# Patient Record
Sex: MALE | Race: WHITE | NOT HISPANIC OR LATINO | URBAN - METROPOLITAN AREA
[De-identification: names, ages, dates, MRNs, and addresses within clinical notes are randomized per-mention and may not be internally consistent; named-entity substitution may affect disease eponyms.]

---

## 2017-08-12 ENCOUNTER — OUTPATIENT (OUTPATIENT)
Dept: OUTPATIENT SERVICES | Facility: HOSPITAL | Age: 57
LOS: 1 days | End: 2017-08-12
Payer: COMMERCIAL

## 2017-08-12 ENCOUNTER — APPOINTMENT (OUTPATIENT)
Dept: RADIOLOGY | Facility: IMAGING CENTER | Age: 57
End: 2017-08-12
Payer: COMMERCIAL

## 2017-08-12 DIAGNOSIS — Z00.8 ENCOUNTER FOR OTHER GENERAL EXAMINATION: ICD-10-CM

## 2017-08-12 PROCEDURE — 73502 X-RAY EXAM HIP UNI 2-3 VIEWS: CPT

## 2017-08-12 PROCEDURE — 73502 X-RAY EXAM HIP UNI 2-3 VIEWS: CPT | Mod: 26,LT

## 2017-12-23 ENCOUNTER — APPOINTMENT (OUTPATIENT)
Dept: RADIOLOGY | Facility: CLINIC | Age: 57
End: 2017-12-23
Payer: MEDICAID

## 2017-12-23 ENCOUNTER — OUTPATIENT (OUTPATIENT)
Dept: OUTPATIENT SERVICES | Facility: HOSPITAL | Age: 57
LOS: 1 days | End: 2017-12-23
Payer: COMMERCIAL

## 2017-12-23 DIAGNOSIS — S73.199A OTHER SPRAIN OF UNSPECIFIED HIP, INITIAL ENCOUNTER: ICD-10-CM

## 2017-12-23 DIAGNOSIS — M25.859 OTHER SPECIFIED JOINT DISORDERS, UNSPECIFIED HIP: ICD-10-CM

## 2017-12-23 DIAGNOSIS — M19.90 UNSPECIFIED OSTEOARTHRITIS, UNSPECIFIED SITE: ICD-10-CM

## 2017-12-23 PROCEDURE — 73502 X-RAY EXAM HIP UNI 2-3 VIEWS: CPT

## 2017-12-23 PROCEDURE — 73502 X-RAY EXAM HIP UNI 2-3 VIEWS: CPT | Mod: 26,LT

## 2018-01-16 ENCOUNTER — OUTPATIENT (OUTPATIENT)
Dept: OUTPATIENT SERVICES | Facility: HOSPITAL | Age: 58
LOS: 1 days | End: 2018-01-16
Payer: COMMERCIAL

## 2018-01-16 ENCOUNTER — APPOINTMENT (OUTPATIENT)
Dept: SURGERY | Facility: CLINIC | Age: 58
End: 2018-01-16

## 2018-01-16 DIAGNOSIS — M25.852 OTHER SPECIFIED JOINT DISORDERS, LEFT HIP: ICD-10-CM

## 2018-01-16 DIAGNOSIS — Z01.818 ENCOUNTER FOR OTHER PREPROCEDURAL EXAMINATION: ICD-10-CM

## 2018-01-16 DIAGNOSIS — M19.90 UNSPECIFIED OSTEOARTHRITIS, UNSPECIFIED SITE: ICD-10-CM

## 2018-01-16 DIAGNOSIS — S73.192D OTHER SPRAIN OF LEFT HIP, SUBSEQUENT ENCOUNTER: ICD-10-CM

## 2018-01-16 LAB
ALBUMIN SERPL ELPH-MCNC: 4.7 G/DL — SIGNIFICANT CHANGE UP (ref 3.3–5)
ALP SERPL-CCNC: 53 U/L — SIGNIFICANT CHANGE UP (ref 40–120)
ALT FLD-CCNC: 30 U/L — SIGNIFICANT CHANGE UP (ref 10–45)
ANION GAP SERPL CALC-SCNC: 14 MMOL/L — SIGNIFICANT CHANGE UP (ref 5–17)
APTT BLD: 30.3 SEC — SIGNIFICANT CHANGE UP (ref 27.5–37.4)
AST SERPL-CCNC: 22 U/L — SIGNIFICANT CHANGE UP (ref 10–40)
BILIRUB SERPL-MCNC: 1.2 MG/DL — SIGNIFICANT CHANGE UP (ref 0.2–1.2)
BUN SERPL-MCNC: 17 MG/DL — SIGNIFICANT CHANGE UP (ref 7–23)
CALCIUM SERPL-MCNC: 9.7 MG/DL — SIGNIFICANT CHANGE UP (ref 8.4–10.5)
CHLORIDE SERPL-SCNC: 100 MMOL/L — SIGNIFICANT CHANGE UP (ref 96–108)
CO2 SERPL-SCNC: 23 MMOL/L — SIGNIFICANT CHANGE UP (ref 22–31)
CREAT SERPL-MCNC: 0.96 MG/DL — SIGNIFICANT CHANGE UP (ref 0.5–1.3)
GLUCOSE SERPL-MCNC: 91 MG/DL — SIGNIFICANT CHANGE UP (ref 70–99)
HCT VFR BLD CALC: 44 % — SIGNIFICANT CHANGE UP (ref 39–50)
HGB BLD-MCNC: 14.9 G/DL — SIGNIFICANT CHANGE UP (ref 13–17)
INR BLD: 1.11 — SIGNIFICANT CHANGE UP (ref 0.88–1.16)
MCHC RBC-ENTMCNC: 31 PG — SIGNIFICANT CHANGE UP (ref 27–34)
MCHC RBC-ENTMCNC: 33.9 G/DL — SIGNIFICANT CHANGE UP (ref 32–36)
MCV RBC AUTO: 91.7 FL — SIGNIFICANT CHANGE UP (ref 80–100)
PLATELET # BLD AUTO: 209 K/UL — SIGNIFICANT CHANGE UP (ref 150–400)
POTASSIUM SERPL-MCNC: 4.2 MMOL/L — SIGNIFICANT CHANGE UP (ref 3.5–5.3)
POTASSIUM SERPL-SCNC: 4.2 MMOL/L — SIGNIFICANT CHANGE UP (ref 3.5–5.3)
PROT SERPL-MCNC: 7.6 G/DL — SIGNIFICANT CHANGE UP (ref 6–8.3)
PROTHROM AB SERPL-ACNC: 12.4 SEC — SIGNIFICANT CHANGE UP (ref 9.8–12.7)
RBC # BLD: 4.8 M/UL — SIGNIFICANT CHANGE UP (ref 4.2–5.8)
RBC # FLD: 12.7 % — SIGNIFICANT CHANGE UP (ref 10.3–16.9)
SODIUM SERPL-SCNC: 137 MMOL/L — SIGNIFICANT CHANGE UP (ref 135–145)
WBC # BLD: 7.4 K/UL — SIGNIFICANT CHANGE UP (ref 3.8–10.5)
WBC # FLD AUTO: 7.4 K/UL — SIGNIFICANT CHANGE UP (ref 3.8–10.5)

## 2018-01-16 PROCEDURE — 93010 ELECTROCARDIOGRAM REPORT: CPT

## 2018-07-16 ENCOUNTER — OUTPATIENT (OUTPATIENT)
Dept: OUTPATIENT SERVICES | Facility: HOSPITAL | Age: 58
LOS: 1 days | End: 2018-07-16
Payer: COMMERCIAL

## 2018-07-16 PROCEDURE — 73502 X-RAY EXAM HIP UNI 2-3 VIEWS: CPT

## 2018-07-16 PROCEDURE — 73502 X-RAY EXAM HIP UNI 2-3 VIEWS: CPT | Mod: 26,LT

## 2019-07-24 ENCOUNTER — APPOINTMENT (OUTPATIENT)
Dept: COLORECTAL SURGERY | Facility: CLINIC | Age: 59
End: 2019-07-24
Payer: COMMERCIAL

## 2019-07-24 VITALS
SYSTOLIC BLOOD PRESSURE: 110 MMHG | WEIGHT: 160 LBS | DIASTOLIC BLOOD PRESSURE: 70 MMHG | RESPIRATION RATE: 12 BRPM | HEIGHT: 67 IN | HEART RATE: 54 BPM | BODY MASS INDEX: 25.11 KG/M2

## 2019-07-24 DIAGNOSIS — K63.5 POLYP OF COLON: ICD-10-CM

## 2019-07-24 DIAGNOSIS — Z83.49 FAMILY HISTORY OF OTHER ENDOCRINE, NUTRITIONAL AND METABOLIC DISEASES: ICD-10-CM

## 2019-07-24 DIAGNOSIS — Z80.0 FAMILY HISTORY OF MALIGNANT NEOPLASM OF DIGESTIVE ORGANS: ICD-10-CM

## 2019-07-24 PROCEDURE — 99203 OFFICE O/P NEW LOW 30 MIN: CPT

## 2019-07-24 NOTE — HISTORY OF PRESENT ILLNESS
[FreeTextEntry1] : 58yo WM, fmaternal family hx colon CA/personal hx colon polyps.  Last colonoscopy 2013.  Presents for f/u colonoscopy.

## 2019-07-24 NOTE — PHYSICAL EXAM
[Normal Breath Sounds] : Normal breath sounds [No Edema] : No edema [Normal Heart Sounds] : normal heart sounds [Normal Rate and Rhythm] : normal rate and rhythm [Alert] : alert [Oriented to Place] : oriented to place [Oriented to Person] : oriented to person [Anxious] : anxious [Oriented to Time] : oriented to time [de-identified] : flat +BS NT/ND [de-identified] : NC/AT [de-identified] : +ROM [de-identified] : intact

## 2019-07-24 NOTE — REVIEW OF SYSTEMS
[Chest Pain] : no chest pain [Shortness Of Breath] : no shortness of breath [Palpitations] : no palpitations [Wheezing] : no wheezing [Easy Bleeding] : no tendency for easy bleeding [Negative] : Psychiatric [FreeTextEntry7] : daily BM

## 2019-08-05 ENCOUNTER — APPOINTMENT (OUTPATIENT)
Dept: COLORECTAL SURGERY | Facility: CLINIC | Age: 59
End: 2019-08-05
Payer: MEDICAID

## 2019-08-05 PROCEDURE — 45378 DIAGNOSTIC COLONOSCOPY: CPT

## 2019-08-05 RX ORDER — SODIUM PICOSULFATE, MAGNESIUM OXIDE, AND ANHYDROUS CITRIC ACID 10; 3.5; 12 MG/160ML; G/160ML; G/160ML
10-3.5-12 MG-GM LIQUID ORAL
Qty: 2 | Refills: 0 | Status: DISCONTINUED | COMMUNITY
Start: 2019-07-24 | End: 2019-08-05

## 2019-08-16 ENCOUNTER — OUTPATIENT (OUTPATIENT)
Dept: OUTPATIENT SERVICES | Facility: HOSPITAL | Age: 59
LOS: 1 days | End: 2019-08-16
Payer: COMMERCIAL

## 2019-08-16 PROCEDURE — 73501 X-RAY EXAM HIP UNI 1 VIEW: CPT

## 2019-08-16 PROCEDURE — 73501 X-RAY EXAM HIP UNI 1 VIEW: CPT | Mod: 26,LT

## 2023-09-01 ENCOUNTER — APPOINTMENT (OUTPATIENT)
Dept: COLORECTAL SURGERY | Facility: CLINIC | Age: 63
End: 2023-09-01
Payer: MEDICAID

## 2023-09-01 VITALS
BODY MASS INDEX: 26.68 KG/M2 | WEIGHT: 170 LBS | RESPIRATION RATE: 12 BRPM | TEMPERATURE: 97.7 F | DIASTOLIC BLOOD PRESSURE: 64 MMHG | HEART RATE: 54 BPM | HEIGHT: 67 IN | SYSTOLIC BLOOD PRESSURE: 110 MMHG

## 2023-09-01 PROCEDURE — 99203 OFFICE O/P NEW LOW 30 MIN: CPT

## 2023-09-01 NOTE — HISTORY OF PRESENT ILLNESS
[FreeTextEntry1] : 62yo WM, maternal grandmother +hx colon CA/remote personal hx colonic polyp. Last colonoscopy 2019 (nl study). Presents for f/u colonoscopy.  Daily BM. Denies abdominal pain, rectal bleeding.

## 2023-09-01 NOTE — PHYSICAL EXAM
[Normal Breath Sounds] : Normal breath sounds [Normal Heart Sounds] : normal heart sounds [Normal Rate and Rhythm] : normal rate and rhythm [No Edema] : No edema [Alert] : alert [Oriented to Person] : oriented to person [Oriented to Place] : oriented to place [Oriented to Time] : oriented to time [Calm] : calm [de-identified] : flat +BS NT/ND [de-identified] : NC/AT [de-identified] : +ROM [de-identified] : intact

## 2023-10-09 ENCOUNTER — APPOINTMENT (OUTPATIENT)
Dept: COLORECTAL SURGERY | Facility: CLINIC | Age: 63
End: 2023-10-09
Payer: MEDICAID

## 2023-10-09 PROCEDURE — 45385 COLONOSCOPY W/LESION REMOVAL: CPT

## 2023-10-11 LAB — CORE LAB BIOPSY: NORMAL

## 2025-03-18 ENCOUNTER — RX ONLY (RX ONLY)
Age: 65
End: 2025-03-18

## 2025-03-18 ENCOUNTER — OFFICE (OUTPATIENT)
Dept: URBAN - METROPOLITAN AREA CLINIC 27 | Facility: CLINIC | Age: 65
Setting detail: OPHTHALMOLOGY
End: 2025-03-18
Payer: COMMERCIAL

## 2025-03-18 DIAGNOSIS — H35.63: ICD-10-CM

## 2025-03-18 DIAGNOSIS — H01.001: ICD-10-CM

## 2025-03-18 DIAGNOSIS — H40.013: ICD-10-CM

## 2025-03-18 DIAGNOSIS — H25.13: ICD-10-CM

## 2025-03-18 DIAGNOSIS — H52.13: ICD-10-CM

## 2025-03-18 PROCEDURE — 92250 FUNDUS PHOTOGRAPHY W/I&R: CPT | Performed by: OPHTHALMOLOGY

## 2025-03-18 PROCEDURE — 92004 COMPRE OPH EXAM NEW PT 1/>: CPT | Performed by: OPHTHALMOLOGY

## 2025-03-18 PROCEDURE — 76514 ECHO EXAM OF EYE THICKNESS: CPT | Performed by: OPHTHALMOLOGY

## 2025-03-18 PROCEDURE — 92015 DETERMINE REFRACTIVE STATE: CPT | Performed by: OPHTHALMOLOGY

## 2025-03-18 ASSESSMENT — LID EXAM ASSESSMENTS
OS_COMMENTS: MODERATE COLLARETTES
OS_BLEPHARITIS: LLL LUL 3+ 4+
OD_BLEPHARITIS: RLL RUL 3+ 4+
OD_COMMENTS: MODERATE COLLARETTES

## 2025-03-18 ASSESSMENT — PACHYMETRY
OS_CT_CORRECTION: 1
OD_CT_CORRECTION: 1
OS_CT_UM: 537
OD_CT_UM: 535

## 2025-03-18 ASSESSMENT — KERATOMETRY
OS_K2POWER_DIOPTERS: 440.50
OS_AXISANGLE_DEGREES: 87
METHOD_AUTO_MANUAL: AUTO
OD_K2POWER_DIOPTERS: 44.00
OD_AXISANGLE_DEGREES: 90
OD_K1POWER_DIOPTERS: 44.00
OS_K1POWER_DIOPTERS: 44.00

## 2025-03-18 ASSESSMENT — REFRACTION_MANIFEST
OD_SPHERE: -2.25
OD_CYLINDER: SPH
OS_VA1: 20/20
OS_CYLINDER: SPH
OD_VA1: 20/20
OS_SPHERE: -2.25

## 2025-03-18 ASSESSMENT — REFRACTION_AUTOREFRACTION
OD_SPHERE: -1.75
OD_AXIS: 00
OS_SPHERE: -2.25
OS_AXIS: 1
OS_CYLINDER: +0.25
OD_CYLINDER: 0.00

## 2025-03-18 ASSESSMENT — TONOMETRY
OS_IOP_MMHG: 14
OS_IOP_MMHG: 14
OD_IOP_MMHG: 12
OD_IOP_MMHG: 12

## 2025-03-18 ASSESSMENT — CONFRONTATIONAL VISUAL FIELD TEST (CVF)
OD_FINDINGS: FULL
OS_FINDINGS: FULL

## 2025-03-18 ASSESSMENT — REFRACTION_CURRENTRX
OS_VPRISM_DIRECTION: SV
OD_VPRISM_DIRECTION: SV
OD_AXIS: SPH
OS_AXIS: SPH
OD_OVR_VA: 20/
OS_OVR_VA: 20/
OD_SPHERE: -2.50
OD_CYLINDER: SPH
OS_SPHERE: -2.25
OS_CYLINDER: SPH

## 2025-03-18 ASSESSMENT — VISUAL ACUITY
OD_BCVA: 20/20
OS_BCVA: 20/30

## 2025-03-20 ENCOUNTER — DOCTOR'S OFFICE (OUTPATIENT)
Facility: LOCATION | Age: 65
Setting detail: OPHTHALMOLOGY
End: 2025-03-20
Payer: COMMERCIAL

## 2025-03-20 DIAGNOSIS — H43.391: ICD-10-CM

## 2025-03-20 DIAGNOSIS — H35.62: ICD-10-CM

## 2025-03-20 PROBLEM — H52.7 REFRACTIVE ERROR ; BOTH EYES: Status: ACTIVE | Noted: 2025-03-18

## 2025-03-20 PROBLEM — H01.001 BLEPHARITIS; RIGHT UPPER LID, RIGHT LOWER LID, LEFT UPPER LID, LEFT LOWER LID: Status: ACTIVE | Noted: 2025-03-18

## 2025-03-20 PROBLEM — H01.002 BLEPHARITIS; RIGHT UPPER LID, RIGHT LOWER LID, LEFT UPPER LID, LEFT LOWER LID: Status: ACTIVE | Noted: 2025-03-18

## 2025-03-20 PROBLEM — H01.005 BLEPHARITIS; RIGHT UPPER LID, RIGHT LOWER LID, LEFT UPPER LID, LEFT LOWER LID: Status: ACTIVE | Noted: 2025-03-18

## 2025-03-20 PROBLEM — H25.13 CATARACT SENILE NUCLEAR SCLEROSIS; BOTH EYES: Status: ACTIVE | Noted: 2025-03-18

## 2025-03-20 PROBLEM — H40.013 GLAUCOMA SUSPECT, LOW RISK 1-2 FACTORS; BOTH EYES: Status: ACTIVE | Noted: 2025-03-18

## 2025-03-20 PROBLEM — H01.004 BLEPHARITIS; RIGHT UPPER LID, RIGHT LOWER LID, LEFT UPPER LID, LEFT LOWER LID: Status: ACTIVE | Noted: 2025-03-18

## 2025-03-20 PROCEDURE — 92201 OPSCPY EXTND RTA DRAW UNI/BI: CPT | Performed by: OPHTHALMOLOGY

## 2025-03-20 PROCEDURE — 92012 INTRM OPH EXAM EST PATIENT: CPT | Performed by: OPHTHALMOLOGY

## 2025-03-20 PROCEDURE — 92134 CPTRZ OPH DX IMG PST SGM RTA: CPT | Performed by: OPHTHALMOLOGY

## 2025-03-20 ASSESSMENT — REFRACTION_AUTOREFRACTION
OD_AXIS: 00
OS_SPHERE: -2.25
OS_AXIS: 1
OD_CYLINDER: 0.00
OS_CYLINDER: +0.25
OD_SPHERE: -1.75

## 2025-03-20 ASSESSMENT — KERATOMETRY
METHOD_AUTO_MANUAL: AUTO
OD_K1POWER_DIOPTERS: 44.00
OS_K2POWER_DIOPTERS: 440.50
OS_AXISANGLE_DEGREES: 87
OD_AXISANGLE_DEGREES: 90
OD_K2POWER_DIOPTERS: 44.00
OS_K1POWER_DIOPTERS: 44.00

## 2025-03-20 ASSESSMENT — LID EXAM ASSESSMENTS
OS_COMMENTS: MODERATE COLLARETTES
OS_BLEPHARITIS: LLL LUL 3+ 4+
OD_COMMENTS: MODERATE COLLARETTES
OD_BLEPHARITIS: RLL RUL 3+ 4+

## 2025-03-20 ASSESSMENT — CONFRONTATIONAL VISUAL FIELD TEST (CVF)
OS_FINDINGS: FULL
OD_FINDINGS: FULL

## 2025-03-20 ASSESSMENT — VISUAL ACUITY
OD_BCVA: 20/20
OS_BCVA: 20/20

## 2025-04-24 ENCOUNTER — DOCTOR'S OFFICE (OUTPATIENT)
Facility: LOCATION | Age: 65
Setting detail: OPHTHALMOLOGY
End: 2025-04-24
Payer: COMMERCIAL

## 2025-04-24 DIAGNOSIS — H35.62: ICD-10-CM

## 2025-04-24 DIAGNOSIS — H43.391: ICD-10-CM

## 2025-04-24 PROCEDURE — 92134 CPTRZ OPH DX IMG PST SGM RTA: CPT | Performed by: OPHTHALMOLOGY

## 2025-04-24 PROCEDURE — 99213 OFFICE O/P EST LOW 20 MIN: CPT | Performed by: OPHTHALMOLOGY

## 2025-04-24 ASSESSMENT — CONFRONTATIONAL VISUAL FIELD TEST (CVF)
OS_FINDINGS: FULL
OD_FINDINGS: FULL

## 2025-04-24 ASSESSMENT — LID EXAM ASSESSMENTS
OD_COMMENTS: MODERATE COLLARETTES
OD_BLEPHARITIS: RLL RUL 3+ 4+
OS_BLEPHARITIS: LLL LUL 3+ 4+
OS_COMMENTS: MODERATE COLLARETTES

## 2025-04-24 ASSESSMENT — KERATOMETRY
METHOD_AUTO_MANUAL: AUTO
OD_K1POWER_DIOPTERS: 44.00
OD_K2POWER_DIOPTERS: 44.00
OD_AXISANGLE_DEGREES: 90
OS_K1POWER_DIOPTERS: 44.00
OS_AXISANGLE_DEGREES: 87
OS_K2POWER_DIOPTERS: 440.50

## 2025-04-24 ASSESSMENT — REFRACTION_AUTOREFRACTION
OD_CYLINDER: 0.00
OD_SPHERE: -1.75
OD_AXIS: 00
OS_SPHERE: -2.25
OS_AXIS: 1
OS_CYLINDER: +0.25

## 2025-04-24 ASSESSMENT — VISUAL ACUITY
OD_BCVA: 20/20
OS_BCVA: 20/20

## 2025-07-24 ENCOUNTER — DOCTOR'S OFFICE (OUTPATIENT)
Facility: LOCATION | Age: 65
Setting detail: OPHTHALMOLOGY
End: 2025-07-24
Payer: COMMERCIAL

## 2025-07-24 DIAGNOSIS — H35.62: ICD-10-CM

## 2025-07-24 PROCEDURE — 99213 OFFICE O/P EST LOW 20 MIN: CPT | Performed by: OPHTHALMOLOGY

## 2025-07-24 PROCEDURE — 92134 CPTRZ OPH DX IMG PST SGM RTA: CPT | Performed by: OPHTHALMOLOGY

## 2025-07-24 ASSESSMENT — LID EXAM ASSESSMENTS
OS_BLEPHARITIS: LLL LUL 3+ 4+
OS_COMMENTS: MODERATE COLLARETTES
OD_COMMENTS: MODERATE COLLARETTES
OD_BLEPHARITIS: RLL RUL 3+ 4+

## 2025-07-25 ASSESSMENT — KERATOMETRY
OS_K2POWER_DIOPTERS: 440.50
OD_K1POWER_DIOPTERS: 44.00
OD_AXISANGLE_DEGREES: 90
OS_AXISANGLE_DEGREES: 87
OS_K1POWER_DIOPTERS: 44.00
METHOD_AUTO_MANUAL: AUTO
OD_K2POWER_DIOPTERS: 44.00

## 2025-07-25 ASSESSMENT — REFRACTION_AUTOREFRACTION
OD_SPHERE: -1.75
OS_CYLINDER: +0.25
OS_AXIS: 1
OS_SPHERE: -2.25
OD_AXIS: 00
OD_CYLINDER: 0.00

## 2025-07-25 ASSESSMENT — VISUAL ACUITY
OS_BCVA: 20/20
OD_BCVA: 20/20